# Patient Record
Sex: MALE | Race: WHITE
[De-identification: names, ages, dates, MRNs, and addresses within clinical notes are randomized per-mention and may not be internally consistent; named-entity substitution may affect disease eponyms.]

---

## 2019-12-10 ENCOUNTER — TRANSCRIBE ORDERS (OUTPATIENT)
Dept: OTHER | Age: 79
End: 2019-12-10

## 2019-12-10 DIAGNOSIS — N35.912 UNSPECIFIED BULBOUS URETHRAL STRICTURE, MALE: Primary | ICD-10-CM

## 2019-12-12 ENCOUNTER — PRE VISIT (OUTPATIENT)
Dept: UROLOGY | Facility: CLINIC | Age: 79
End: 2019-12-12

## 2019-12-12 DIAGNOSIS — Q64.32 CONGENITAL STRICTURE OF URETHRA: Primary | ICD-10-CM

## 2019-12-12 NOTE — TELEPHONE ENCOUNTER
MEDICAL RECORDS REQUEST   [facility]  Urology Clinic  [address] [place] [address]  PHONE: [phone]  Fax: [phone]        FUTURE VISIT INFORMATION                                                   Jayjay Mcgowan, : 1940 scheduled for future visit at [facility]    APPOINTMENT INFORMATION:    Date: 19 7AM    Provider:  Nithin Preez MD    Reason for Visit/Diagnosis: Eval for urethroplasty     REFERRAL INFORMATION:    Referring provider:  Amy De Jesus     Specialty: MD    [facility]     Clinic contact number:  [phone]    RECORDS REQUESTED FOR VISIT                                                     NOTES  STATUS/DETAILS   OFFICE NOTE from referring provider  yes   OFFICE NOTE from other specialist  no   DISCHARGE SUMMARY from hospital  no   DISCHARGE REPORT from the ER  no   OPERATIVE REPORT  no   MEDICATION LIST  no   LABS     URINALYSIS (UA) / PSA  yes     PRE-VISIT CHECKLIST      Record collection complete Yes- [facility] recs [facility] under Media tab    Appointment appropriately scheduled           (right time/right provider) Yes   MyChart activation If no, please explain: In process    Questionnaire complete If no, please explain: In process      Completed by: Yaneth Avila

## 2019-12-12 NOTE — TELEPHONE ENCOUNTER
Reason for visit: Urethral stricture consult     Relevant information: pt [facility], pt has a catheter    Records/imaging/labs/orders: orders placed for RUG/VCUG    Pt called: Yes    At Rooming: regular

## 2019-12-20 NOTE — PROGRESS NOTES
Urology Clinic    Nithin Perez MD  Date of Service: 2019     Name: Jayjay Mcgowan  MRN: [id number]  Age: 78 year old  : 1940  Referring provider: Amy De Jesus     Assessment and Plan:  A 78 year old-year-old gentleman with urethral stricture refractory to minimally invasive management.  He has been managed by prior urologist(s) and is referred to our center for advanced treatment options.    We removed the catheter today. He will follow up in 1 month with RUG VCUG. Based on x-rays, we may recommend SPT placement. We discussed urethroplasty as a possibility but may he may be too medically unfit. He may be a candidate for the drug coated balloon study based on his imaging next month.This is the treatment I would favor if the x-rays show him to be a candidate.    If he is not a candidate for Optilume study or urethroplasty then options would be intermittent office dilation, self dilation or SPT.   ---------------------------------------------------------------------------------------------------------------------    Chief Complaint:   Urethral stricture     HPI:   Mr. Mcgowan is a 78 year old-year-old man [facility]. Symptoms include slow stream and sense of incomplete emptying. He has had prior treatments for his urethral stricture. He does not have a history of self dilation. He currently performs self dilation Never. He does currently have an indwelling urethral catheter.    I don't have any records to review, but according to patients description it sounds like he has a history of prostate gold seed marker and [facility]. About 5 years ago he developed problems with MARIN/stricture. He has had 4-5 cystoscopies with dilation. Last cystoscopy was in 2019. Recently he went into retention and had a catheter placed about 15 days ago. He has occasional leakage, occasional hematuria, and has had one infection in  the past two years. The catheter is painful but draining well. He had one cystoscopy, clot evacuation, and fulguration greater than 3 years ago. Location of stricture is unclear.     Etiology: External beam radiation.     REVIEW OF QUESTIONNAIRES:  Questionnaires reviewed. See flowsheet for details.    He uses compression stockings for edema. He has COPD and can walk 1 flight of stairs before he is limited by shortness of breath. He is on warfarin for a history of DVT after travel. He denies constipation, diarrhea, or blood in the stool.     Review of Systems:   Pertinent items are noted in HPI or as below, remainder of complete ROS is negative.      Active Medications:     Current Outpatient Medications:      albuterol (PROAIR HFA, PROVENTIL HFA, VENTOLIN HFA) 108 (90 BASE) MCG/ACT inhaler, Inhale 2 puffs into the lungs every 6 hours, Disp: , Rfl:      budesonide-formoterol (SYMBICORT) 160-4.5 MCG/ACT inhaler, Inhale 2 puffs into the lungs 2 times daily, Disp: , Rfl:      fluticasone (FLOVENT DISKUS) 50 MCG/BLIST AEPB, Inhale 1 puff into the lungs every 12 hours, Disp: , Rfl:      hydrOXYzine (VISTARIL) 50 MG capsule, Take 50 mg by mouth 3 times daily as needed for itching, Disp: , Rfl:      PREDNISONE PO, Take by mouth as needed, Disp: , Rfl:      TAMSULOSIN HCL PO, Take 0.4 mg by mouth daily, Disp: , Rfl:      tiotropium (SPIRIVA) 18 MCG inhalation capsule, Inhale 18 mcg into the lungs daily, Disp: , Rfl:      WARFARIN SODIUM PO, Take 2 mg by mouth, Disp: , Rfl:      acetaminophen-codeine (TYLENOL #3) 300-30 MG per tablet, Take 1-2 tablets by mouth every 4 hours as needed for moderate pain, Disp: , Rfl:      CEPHALEXIN PO, Take 50 mg/kg/day by mouth, Disp: , Rfl:      ketoconazole (NIZORAL) 2 % shampoo, Apply topically daily as needed for itching or irritation, Disp: , Rfl:      MINOCYCLINE HCL PO, Take 100 mg by mouth, Disp: , Rfl:       Allergies:   Patient has no known allergies.      Past Medical  "History:  Squamous cell carcinoma  COPD   DVT after travel   No history of diabetes or MI.     Past Surgical History:  Skin lesion biopsy   MOHS procedure     Family History:   No past pertinent family history.     Social History:   Former smoker.      Physical Exam:   General:  Well-dressed, well-nourished man in no acute distress.  Vitals: BP (!) 143/72   Pulse 86   Ht 1.778 m (5' 10\")   Wt 111.6 kg (246 lb)   BMI 35.30 kg/m   Estimated body mass index is 35.3 kg/m  as calculated from the following:    Height as of this encounter: 1.778 m (5' 10\").    Weight as of this encounter: 111.6 kg (246 lb).  Eyes: anicteric, EOMI  Lymph: No cervical, supraclavicular or axillary lymphadenopathy  Lungs:  No respiratory distress.  Heart:  Regular rate and rhythm.     Abdomen:  moderately obese, soft, nontender, without masses.  There are not surgical scars.    :  Penis is not circumcised. Testes are 10 mL bilaterally without masses.  Rectal examination was not done. Catheter is in place in penis with some mild urethral meatus erosion. We reduced a paraphimosis and removed the urethral catheter.   Musculoskeletal:  Motor strength is excellent throughout.     Neurologic:  Grossly nonfocal.    Back: Flanks are nontender.    Scribe Disclosure:  I, Lucy Traylor, am serving as a scribe to document services personally performed by Nithin Perez MD at this visit, based upon the provider's statements to me. All documentation has been reviewed by the aforementioned provider prior to being entered into the official medical record.     Answers for HPI/ROS submitted by the patient on 12/23/2019   General Symptoms: Yes  Skin Symptoms: No  HENT Symptoms: No  EYE SYMPTOMS: No  HEART SYMPTOMS: No  LUNG SYMPTOMS: Yes  INTESTINAL SYMPTOMS: No  URINARY SYMPTOMS: Yes  REPRODUCTIVE SYMPTOMS: Yes  SKELETAL SYMPTOMS: No  BLOOD SYMPTOMS: Yes  NERVOUS SYSTEM SYMPTOMS: No  MENTAL HEALTH SYMPTOMS: Yes  Fever: No  Loss of appetite: " No  Weight loss: Yes  Weight gain: No  Fatigue: Yes  Night sweats: No  Chills: No  Increased stress: Yes  Excessive hunger: No  Excessive thirst: No  Feeling hot or cold when others believe the temperature is normal: No  Loss of height: No  Post-operative complications: No  Surgical site pain: Yes  Hallucinations: No  Change in or Loss of Energy: Yes  Hyperactivity: No  Confusion: No  Cough: No  Sputum or phlegm: No  Coughing up blood: No  Difficulty breating or shortness of breath: Yes  Snoring: No  Wheezing: Yes  Difficulty breathing on exertion: Yes  Nighttime Cough: No  Difficulty breathing when lying flat: No  Trouble holding urine or incontinence: Yes  Pain or burning: Yes  Trouble starting or stopping: Yes  Increased frequency of urination: No  Blood in urine: No  Decreased frequency of urination: Yes  Frequent nighttime urination: Yes  Flank pain: No  Difficulty emptying bladder: Yes  Anemia: No  Swollen glands: No  Easy bleeding or bruising: Yes  Edema or swelling: No  Scrotal pain or swelling: No  Erectile dysfunction: Yes  Penile discharge: No  Genital ulcers: No  Reduced libido: Yes  Nervous or Anxious: Yes  Depression: No  Trouble sleeping: No  Trouble thinking or concentrating: Yes  Mood changes: No  Panic attacks: No

## 2019-12-23 ENCOUNTER — OFFICE VISIT (OUTPATIENT)
Dept: UROLOGY | Facility: CLINIC | Age: 79
End: 2019-12-23
Payer: COMMERCIAL

## 2019-12-23 ENCOUNTER — PRE VISIT (OUTPATIENT)
Dept: UROLOGY | Facility: CLINIC | Age: 79
End: 2019-12-23

## 2019-12-23 VITALS
SYSTOLIC BLOOD PRESSURE: 143 MMHG | BODY MASS INDEX: 35.22 KG/M2 | HEIGHT: 70 IN | DIASTOLIC BLOOD PRESSURE: 72 MMHG | HEART RATE: 86 BPM | WEIGHT: 246 LBS

## 2019-12-23 DIAGNOSIS — C61 MALIGNANT NEOPLASM OF PROSTATE (H): Primary | ICD-10-CM

## 2019-12-23 DIAGNOSIS — N35.812 OTHER STRICTURE OF BULBOUS URETHRA IN MALE: ICD-10-CM

## 2019-12-23 ASSESSMENT — ENCOUNTER SYMPTOMS
CHILLS: 0
FLANK PAIN: 0
FATIGUE: 1
PANIC: 0
POSTURAL DYSPNEA: 0
POLYDIPSIA: 0
COUGH: 0
SNORES LOUDLY: 0
SHORTNESS OF BREATH: 1
DECREASED APPETITE: 0
FEVER: 0
DYSPNEA ON EXERTION: 1
SPUTUM PRODUCTION: 0
INCREASED ENERGY: 1
HALLUCINATIONS: 0
ALTERED TEMPERATURE REGULATION: 0
DYSURIA: 1
COUGH DISTURBING SLEEP: 0
HEMATURIA: 0
DECREASED CONCENTRATION: 1
BRUISES/BLEEDS EASILY: 1
NIGHT SWEATS: 0
WHEEZING: 1
DEPRESSION: 0
WEIGHT LOSS: 1
NERVOUS/ANXIOUS: 1
SWOLLEN GLANDS: 0
DIFFICULTY URINATING: 1
WEIGHT GAIN: 0
POLYPHAGIA: 0
HEMOPTYSIS: 0
INSOMNIA: 0

## 2019-12-23 ASSESSMENT — PAIN SCALES - GENERAL: PAINLEVEL: MILD PAIN (3)

## 2019-12-23 ASSESSMENT — MIFFLIN-ST. JEOR: SCORE: 1847.1

## 2019-12-23 NOTE — NURSING NOTE
16 Fr catheter removed without difficulty after removing 10 mL of fluid from the balloon. Pt tolerated the removal well. Pt instructed to have nothing put in the urethra for at least two weeks prior to imaging.

## 2019-12-23 NOTE — LETTER
2019       RE: Jayjay Mcgowan  [address] [address]     Dear Colleague,    Thank you for referring your patient, Jayjay Mcgowan, to the [facility] AND [facility] at [facility]. Please see a copy of my visit note below.    Urology Clinic    Nithin Perez MD  Date of Service: 2019     Name: Jayjay Mcgowan  MRN: [id number]  Age: 78 year old  : 1940  Referring provider: Amy De Jesus     Assessment and Plan:  A 78 year old-year-old gentleman with urethral stricture refractory to minimally invasive management.  He has been managed by prior urologist(s) and is referred to our center for advanced treatment options.    We removed the catheter today. He will follow up in 1 month with RUG VCUG. Based on x-rays, we may recommend SPT placement. We discussed urethroplasty as a possibility but may he may be too medically unfit. He may be a candidate for the drug coated balloon study based on his imaging next month.This is the treatment I would favor if the x-rays show him to be a candidate.    If he is not a candidate for Optilume study or urethroplasty then options would be intermittent office dilation, self dilation or SPT.   ---------------------------------------------------------------------------------------------------------------------    Chief Complaint:   Urethral stricture     HPI:   Mr. Mcgowan is a 78 year old-year-old man [facility]. Symptoms include slow stream and sense of incomplete emptying. He has had prior treatments for his urethral stricture. He does not have a history of self dilation. He currently performs self dilation Never. He does currently have an indwelling urethral catheter.    I don't have any records to review, but according to patients description it sounds like he has a history of prostate gold seed marker and [facility] beam  [facility]. About 5 years ago he developed problems with MARIN/stricture. He has had 4-5 cystoscopies with dilation. Last cystoscopy was in June 2019. Recently he went into retention and had a catheter placed about 15 days ago. He has occasional leakage, occasional hematuria, and has had one infection in the past two years. The catheter is painful but draining well. He had one cystoscopy, clot evacuation, and fulguration greater than 3 years ago. Location of stricture is unclear.     Etiology: External beam radiation.     REVIEW OF QUESTIONNAIRES:  Questionnaires reviewed. See flowsheet for details.    He uses compression stockings for edema. He has COPD and can walk 1 flight of stairs before he is limited by shortness of breath. He is on warfarin for a history of DVT after travel. He denies constipation, diarrhea, or blood in the stool.     Review of Systems:   Pertinent items are noted in HPI or as below, remainder of complete ROS is negative.      Active Medications:     Current Outpatient Medications:      albuterol (PROAIR HFA, PROVENTIL HFA, VENTOLIN HFA) 108 (90 BASE) MCG/ACT inhaler, Inhale 2 puffs into the lungs every 6 hours, Disp: , Rfl:      budesonide-formoterol (SYMBICORT) 160-4.5 MCG/ACT inhaler, Inhale 2 puffs into the lungs 2 times daily, Disp: , Rfl:      fluticasone (FLOVENT DISKUS) 50 MCG/BLIST AEPB, Inhale 1 puff into the lungs every 12 hours, Disp: , Rfl:      hydrOXYzine (VISTARIL) 50 MG capsule, Take 50 mg by mouth 3 times daily as needed for itching, Disp: , Rfl:      PREDNISONE PO, Take by mouth as needed, Disp: , Rfl:      TAMSULOSIN HCL PO, Take 0.4 mg by mouth daily, Disp: , Rfl:      tiotropium (SPIRIVA) 18 MCG inhalation capsule, Inhale 18 mcg into the lungs daily, Disp: , Rfl:      WARFARIN SODIUM PO, Take 2 mg by mouth, Disp: , Rfl:      acetaminophen-codeine (TYLENOL #3) 300-30 MG per tablet, Take 1-2 tablets by mouth every 4 hours as needed for moderate pain,  "Disp: , Rfl:      CEPHALEXIN PO, Take 50 mg/kg/day by mouth, Disp: , Rfl:      ketoconazole (NIZORAL) 2 % shampoo, Apply topically daily as needed for itching or irritation, Disp: , Rfl:      MINOCYCLINE HCL PO, Take 100 mg by mouth, Disp: , Rfl:       Allergies:   Patient has no known allergies.      Past Medical History:  Squamous cell carcinoma  COPD   DVT after travel   No history of diabetes or MI.     Past Surgical History:  Skin lesion biopsy   MOHS procedure     Family History:   No past pertinent family history.     Social History:   Former smoker.      Physical Exam:   General:  Well-dressed, well-nourished man in no acute distress.  Vitals: BP (!) 143/72   Pulse 86   Ht 1.778 m (5' 10\")   Wt 111.6 kg (246 lb)   BMI 35.30 kg/m    Estimated body mass index is 35.3 kg/m  as calculated from the following:    Height as of this encounter: 1.778 m (5' 10\").    Weight as of this encounter: 111.6 kg (246 lb).  Eyes: anicteric, EOMI  Lymph: No cervical, supraclavicular or axillary lymphadenopathy  Lungs:  No respiratory distress.  Heart:  Regular rate and rhythm.     Abdomen:  moderately obese, soft, nontender, without masses.  There are not surgical scars.    :  Penis is not circumcised. Testes are 10 mL bilaterally without masses.  Rectal examination was not done. Catheter is in place in penis with some mild urethral meatus erosion. We reduced a paraphimosis and removed the urethral catheter.   Musculoskeletal:  Motor strength is excellent throughout.     Neurologic:  Grossly nonfocal.    Back: Flanks are nontender.    Scribe Disclosure:  Lucy IGLESIAS, am serving as a scribe to document services personally performed by Nithin Perez MD at this visit, based upon the provider's statements to me. All documentation has been reviewed by the aforementioned provider prior to being entered into the official medical record.     Answers for HPI/ROS submitted by the patient on 12/23/2019   General " Symptoms: Yes  Skin Symptoms: No  HENT Symptoms: No  EYE SYMPTOMS: No  HEART SYMPTOMS: No  LUNG SYMPTOMS: Yes  INTESTINAL SYMPTOMS: No  URINARY SYMPTOMS: Yes  REPRODUCTIVE SYMPTOMS: Yes  SKELETAL SYMPTOMS: No  BLOOD SYMPTOMS: Yes  NERVOUS SYSTEM SYMPTOMS: No  MENTAL HEALTH SYMPTOMS: Yes  Fever: No  Loss of appetite: No  Weight loss: Yes  Weight gain: No  Fatigue: Yes  Night sweats: No  Chills: No  Increased stress: Yes  Excessive hunger: No  Excessive thirst: No  Feeling hot or cold when others believe the temperature is normal: No  Loss of height: No  Post-operative complications: No  Surgical site pain: Yes  Hallucinations: No  Change in or Loss of Energy: Yes  Hyperactivity: No  Confusion: No  Cough: No  Sputum or phlegm: No  Coughing up blood: No  Difficulty breating or shortness of breath: Yes  Snoring: No  Wheezing: Yes  Difficulty breathing on exertion: Yes  Nighttime Cough: No  Difficulty breathing when lying flat: No  Trouble holding urine or incontinence: Yes  Pain or burning: Yes  Trouble starting or stopping: Yes  Increased frequency of urination: No  Blood in urine: No  Decreased frequency of urination: Yes  Frequent nighttime urination: Yes  Flank pain: No  Difficulty emptying bladder: Yes  Anemia: No  Swollen glands: No  Easy bleeding or bruising: Yes  Edema or swelling: No  Scrotal pain or swelling: No  Erectile dysfunction: Yes  Penile discharge: No  Genital ulcers: No  Reduced libido: Yes  Nervous or Anxious: Yes  Depression: No  Trouble sleeping: No  Trouble thinking or concentrating: Yes  Mood changes: No  Panic attacks: No    Nithin Perez MD

## 2019-12-23 NOTE — PATIENT INSTRUCTIONS
Return in one month with a RUG/VCUG.    Do not have anything inserted into your penis for at least two weeks prior to imaging.

## 2019-12-23 NOTE — NURSING NOTE
"Chief Complaint   Patient presents with     Consult     Urethral stricture consult - pt has a catheter       Blood pressure (!) 143/72, pulse 86, height 1.778 m (5' 10\"), weight 111.6 kg (246 lb). Body mass index is 35.3 kg/m .    There is no problem list on file for this patient.      No Known Allergies    Current Outpatient Medications   Medication Sig Dispense Refill     albuterol (PROAIR HFA, PROVENTIL HFA, VENTOLIN HFA) 108 (90 BASE) MCG/ACT inhaler Inhale 2 puffs into the lungs every 6 hours       budesonide-formoterol (SYMBICORT) 160-4.5 MCG/ACT inhaler Inhale 2 puffs into the lungs 2 times daily       fluticasone (FLOVENT DISKUS) 50 MCG/BLIST AEPB Inhale 1 puff into the lungs every 12 hours       hydrOXYzine (VISTARIL) 50 MG capsule Take 50 mg by mouth 3 times daily as needed for itching       PREDNISONE PO Take by mouth as needed       TAMSULOSIN HCL PO Take 0.4 mg by mouth daily       tiotropium (SPIRIVA) 18 MCG inhalation capsule Inhale 18 mcg into the lungs daily       WARFARIN SODIUM PO Take 2 mg by mouth       acetaminophen-codeine (TYLENOL #3) 300-30 MG per tablet Take 1-2 tablets by mouth every 4 hours as needed for moderate pain       CEPHALEXIN PO Take 50 mg/kg/day by mouth       ketoconazole (NIZORAL) 2 % shampoo Apply topically daily as needed for itching or irritation       MINOCYCLINE HCL PO Take 100 mg by mouth         Social History     Tobacco Use     Smoking status: Former Smoker     Smokeless tobacco: Never Used   Substance Use Topics     Alcohol use: Not on file     Drug use: Not on file       Dianne Harman CMA, BOOKER  12/23/2019  7:05 AM     "

## 2020-01-10 ENCOUNTER — PRE VISIT (OUTPATIENT)
Dept: UROLOGY | Facility: CLINIC | Age: 80
End: 2020-01-10

## 2020-01-11 NOTE — TELEPHONE ENCOUNTER
Reason for visit: Urethral stricture consult          Relevant information: [facility], pt has a catheter     Records/imaging/labs/orders: all appointments scheduled      Pt called: Yes     At Rooming: flow/pvr

## 2020-01-13 NOTE — PROGRESS NOTES
Urology Clinic    Nithin Perez MD  Date of Service: 2020     Name: Jayjay Mcgowan  MRN: [id number]  Age: 79 year old  : 1940  Referring provider: Amy De Jesus     Assessment:  Jayjay Mcgowan  is a 79 year old male with a history of prostate cancer s/p prostate gold prostate marker and external beam radiation in  and recurrent urethral stricture s/p 4-[facility]. Last month he went into urinary retention and had a Antoine placed. Catheter was removed on 2019 and he underwent RUG/VCUG today. We discussed his results which did not show any evidence of urethral stricture, though, it has only been a month since his catheter was removed. 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 We will plan on having him return for a cystoscopy to evaluate. If there is no evidence of stricture at that time, he agrees there would be no need for surgery.     Plan:  -Cystoscopy. If patient calls to report impending urinary retention then we should get him in the office within a couple of days to cysto/dilate.   ---------------------------------------------------------------------------------------------------------------------    Chief Complaint:   Chief Complaint   Patient presents with     RECHECK     urethral stricture consult       HPI:   Jayjay Mcgowan  is a 79 year old male who has a history of urethral stricture last seen with me on 2019. His symptoms include slow stream and incomplete sense of emptying. He has had prior treatments for his urethral stricture. He currently performs self dilation. He does currently have an indwelling urethral catheter.     There are no prior records to review, but according to his description it sounds like he has a history of prostate gold seed marker and [facility]. About 5 years ago he developed problems with MARIN/stricture and since has had 4-5 cystoscopies with  dilation (most completed in office). His last cystoscopy was in June 2019. Recently he went into retention and had a catheter placed in the beginning of December 2019. When I saw him on 12/23/2019, he did report occasional leakage,  Hematuria and one infection in the past 2 years. At that time, he also reported his catheter was painful but draining well. At our last visit, he had his catheter removed and he presents today for RUG/VCUG.     Today, he reports doing well without leakage, urinary retention, urgency or hematuria. However, he does continues to urinate every 1-1.5 hours during the day and 1-3 times per night.     Standardized Questionnaire:  [facility] uro prostate review 1/20/2020   AUA - Total Score 17   Sexual Health History for Men (SARTHAK) 21       Review of Systems:   Pertinent items are noted in HPI or as below, remainder of complete ROS is negative.      Active Medications:      acetaminophen-codeine (TYLENOL #3) 300-30 MG per tablet, Take 1-2 tablets by mouth every 4 hours as needed for moderate pain, Disp: , Rfl:      albuterol (PROAIR HFA, PROVENTIL HFA, VENTOLIN HFA) 108 (90 BASE) MCG/ACT inhaler, Inhale 2 puffs into the lungs every 6 hours, Disp: , Rfl:      budesonide-formoterol (SYMBICORT) 160-4.5 MCG/ACT inhaler, Inhale 2 puffs into the lungs 2 times daily, Disp: , Rfl:      CEPHALEXIN PO, Take 50 mg/kg/day by mouth, Disp: , Rfl:      fluticasone (FLOVENT DISKUS) 50 MCG/BLIST AEPB, Inhale 1 puff into the lungs every 12 hours, Disp: , Rfl:      hydrOXYzine (VISTARIL) 50 MG capsule, Take 50 mg by mouth 3 times daily as needed for itching, Disp: , Rfl:      ketoconazole (NIZORAL) 2 % shampoo, Apply topically daily as needed for itching or irritation, Disp: , Rfl:      MINOCYCLINE HCL PO, Take 100 mg by mouth, Disp: , Rfl:      PREDNISONE PO, Take by mouth as needed, Disp: , Rfl:      TAMSULOSIN HCL PO, Take 0.4 mg by mouth daily, Disp: , Rfl:      tiotropium (SPIRIVA) 18 MCG inhalation capsule, Inhale  "18 mcg into the lungs daily, Disp: , Rfl:      WARFARIN SODIUM PO, Take 2 mg by mouth, Disp: , Rfl:   No current facility-administered medications for this visit.       Allergies:   Patient has no known allergies.      Past Medical History:  Squamous cell carcinoma      Past Surgical History:  MOHS    Family History:   No pertinent family history       Social History:   The patient was accompanied to the appointment by: wife   Smoking Status: former  Smokeless Tobacco: never    Alcohol Use: no      Physical Exam:   General:  Well-dressed, well-nourished man in no acute distress.  Vitals: BP (!) 152/94   Pulse 73   Ht 1.778 m (5' 10\")   Wt 111.1 kg (245 lb)   BMI 35.15 kg/m   Estimated body mass index is 35.15 kg/m  as calculated from the following:    Height as of this encounter: 1.778 m (5' 10\").    Weight as of this encounter: 111.1 kg (245 lb).  Eyes: anicteric, EOMI    Imaging:   I have personally reviewed the results of the below imaging studies. The results were discussed with the patient.   No evidence of urethral stricture. I can see a gold seed near apex of prostate.       Scribe Disclosure:  I, Juli Mckeon, am serving as a scribe to document services personally performed by Nithin Perez MD at this visit, based upon the provider's statements to me. All documentation has been reviewed by the aforementioned provider prior to being entered into the official medical record.           "

## 2020-01-20 ENCOUNTER — ANCILLARY PROCEDURE (OUTPATIENT)
Dept: GENERAL RADIOLOGY | Facility: CLINIC | Age: 80
End: 2020-01-20
Attending: UROLOGY
Payer: COMMERCIAL

## 2020-01-20 ENCOUNTER — OFFICE VISIT (OUTPATIENT)
Dept: UROLOGY | Facility: CLINIC | Age: 80
End: 2020-01-20
Payer: COMMERCIAL

## 2020-01-20 VITALS
HEIGHT: 70 IN | HEART RATE: 73 BPM | SYSTOLIC BLOOD PRESSURE: 152 MMHG | BODY MASS INDEX: 35.07 KG/M2 | WEIGHT: 245 LBS | DIASTOLIC BLOOD PRESSURE: 94 MMHG

## 2020-01-20 DIAGNOSIS — C61 MALIGNANT NEOPLASM OF PROSTATE (H): ICD-10-CM

## 2020-01-20 DIAGNOSIS — N35.812 OTHER STRICTURE OF BULBOUS URETHRA IN MALE: Primary | ICD-10-CM

## 2020-01-20 DIAGNOSIS — Q64.32 CONGENITAL STRICTURE OF URETHRA: ICD-10-CM

## 2020-01-20 RX ORDER — LIDOCAINE HYDROCHLORIDE 20 MG/ML
10 JELLY TOPICAL ONCE
Status: COMPLETED | OUTPATIENT
Start: 2020-01-20 | End: 2020-01-20

## 2020-01-20 RX ADMIN — LIDOCAINE HYDROCHLORIDE 10 ML: 20 JELLY TOPICAL at 09:26

## 2020-01-20 ASSESSMENT — MIFFLIN-ST. JEOR: SCORE: 1837.56

## 2020-01-20 ASSESSMENT — PAIN SCALES - GENERAL: PAINLEVEL: NO PAIN (0)

## 2020-01-20 NOTE — NURSING NOTE
"Chief Complaint   Patient presents with     RECHECK       Blood pressure (!) 152/94, pulse 73, height 1.778 m (5' 10\"), weight 111.1 kg (245 lb). Body mass index is 35.15 kg/m .    There is no problem list on file for this patient.      No Known Allergies    Current Outpatient Medications   Medication Sig Dispense Refill     acetaminophen-codeine (TYLENOL #3) 300-30 MG per tablet Take 1-2 tablets by mouth every 4 hours as needed for moderate pain       albuterol (PROAIR HFA, PROVENTIL HFA, VENTOLIN HFA) 108 (90 BASE) MCG/ACT inhaler Inhale 2 puffs into the lungs every 6 hours       budesonide-formoterol (SYMBICORT) 160-4.5 MCG/ACT inhaler Inhale 2 puffs into the lungs 2 times daily       CEPHALEXIN PO Take 50 mg/kg/day by mouth       fluticasone (FLOVENT DISKUS) 50 MCG/BLIST AEPB Inhale 1 puff into the lungs every 12 hours       hydrOXYzine (VISTARIL) 50 MG capsule Take 50 mg by mouth 3 times daily as needed for itching       ketoconazole (NIZORAL) 2 % shampoo Apply topically daily as needed for itching or irritation       MINOCYCLINE HCL PO Take 100 mg by mouth       PREDNISONE PO Take by mouth as needed       TAMSULOSIN HCL PO Take 0.4 mg by mouth daily       tiotropium (SPIRIVA) 18 MCG inhalation capsule Inhale 18 mcg into the lungs daily       WARFARIN SODIUM PO Take 2 mg by mouth         Social History     Tobacco Use     Smoking status: Former Smoker     Smokeless tobacco: Never Used   Substance Use Topics     Alcohol use: None     Drug use: None       Dianne Harman CMA, RMA  1/20/2020  9:43 AM     "

## 2020-01-20 NOTE — LETTER
2020       RE: Jayjay Mcgowan  [address] [id number]     Dear Colleague,    Thank you for referring your patient, Jayjay Mcgowan, to the [facility] UROLOGY AND [facility] FOR PROSTATE AND UROLOGIC CANCERS at [facility]. Please see a copy of my visit note below.      Urology Clinic    Nithin Perez MD  Date of Service: 2020     Name: Jayjay Mcgowan  MRN: [id number]  Age: 79 year old  : 1940  Referring provider: Amy De Jesus     Assessment:  Jayjay Mcgowan  is a 79 year old male with a history of prostate cancer s/p prostate gold prostate marker and external beam radiation in  and recurrent urethral stricture s/[facility]. Last month he went into urinary retention and had a Antoine placed. Catheter was removed on 2019 and he underwent RUG/VCUG today. We discussed his results which did not show any evidence of urethral stricture, though, it has only been a month since his catheter was removed. 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 We will plan on having him return for a cystoscopy to evaluate. If there is no evidence of stricture at that time, he agrees there would be no need for surgery.     Plan:  -Cystoscopy. If patient calls to report impending urinary retention then we should get him in the office within a couple of days to cysto/dilate.   ---------------------------------------------------------------------------------------------------------------------    Chief Complaint:   Chief Complaint   Patient presents with     RECHECK     urethral stricture consult       HPI:   Jayjay Mcgowan  is a 79 year old male who has a history of urethral stricture last seen with me on 2019. His symptoms include slow stream and incomplete sense of emptying. He has had prior treatments for his urethral stricture. He currently performs self dilation. He does currently  have an indwelling urethral catheter.     There are no prior records to review, but according to his description it sounds like he has a history of prostate gold seed marker and [facility]. About 5 years ago he developed problems with MARIN/stricture and since has had 4-5 cystoscopies with dilation (most completed in office). His last cystoscopy was in June 2019. Recently he went into retention and had a catheter placed in the beginning of December 2019. When I saw him on 12/23/2019, he did report occasional leakage,  Hematuria and one infection in the past 2 years. At that time, he also reported his catheter was painful but draining well. At our last visit, he had his catheter removed and he presents today for RUG/VCUG.     Today, he reports doing well without leakage, urinary retention, urgency or hematuria. However, he does continues to urinate every 1-1.5 hours during the day and 1-3 times per night.     Standardized Questionnaire:  [facility] uro prostate review 1/20/2020   AUA - Total Score 17   Sexual Health History for Men (SARTHAK) 21       Review of Systems:   Pertinent items are noted in HPI or as below, remainder of complete ROS is negative.      Active Medications:      acetaminophen-codeine (TYLENOL #3) 300-30 MG per tablet, Take 1-2 tablets by mouth every 4 hours as needed for moderate pain, Disp: , Rfl:      albuterol (PROAIR HFA, PROVENTIL HFA, VENTOLIN HFA) 108 (90 BASE) MCG/ACT inhaler, Inhale 2 puffs into the lungs every 6 hours, Disp: , Rfl:      budesonide-formoterol (SYMBICORT) 160-4.5 MCG/ACT inhaler, Inhale 2 puffs into the lungs 2 times daily, Disp: , Rfl:      CEPHALEXIN PO, Take 50 mg/kg/day by mouth, Disp: , Rfl:      fluticasone (FLOVENT DISKUS) 50 MCG/BLIST AEPB, Inhale 1 puff into the lungs every 12 hours, Disp: , Rfl:      hydrOXYzine (VISTARIL) 50 MG capsule, Take 50 mg by mouth 3 times daily as needed for itching, Disp: , Rfl:      ketoconazole (NIZORAL) 2 %  "shampoo, Apply topically daily as needed for itching or irritation, Disp: , Rfl:      MINOCYCLINE HCL PO, Take 100 mg by mouth, Disp: , Rfl:      PREDNISONE PO, Take by mouth as needed, Disp: , Rfl:      TAMSULOSIN HCL PO, Take 0.4 mg by mouth daily, Disp: , Rfl:      tiotropium (SPIRIVA) 18 MCG inhalation capsule, Inhale 18 mcg into the lungs daily, Disp: , Rfl:      WARFARIN SODIUM PO, Take 2 mg by mouth, Disp: , Rfl:   No current facility-administered medications for this visit.       Allergies:   Patient has no known allergies.      Past Medical History:  Squamous cell carcinoma      Past Surgical History:  MOHS    Family History:   No pertinent family history       Social History:   The patient was accompanied to the appointment by: wife   Smoking Status: former  Smokeless Tobacco: never    Alcohol Use: no      Physical Exam:   General:  Well-dressed, well-nourished man in no acute distress.  Vitals: BP (!) 152/94   Pulse 73   Ht 1.778 m (5' 10\")   Wt 111.1 kg (245 lb)   BMI 35.15 kg/m    Estimated body mass index is 35.15 kg/m  as calculated from the following:    Height as of this encounter: 1.778 m (5' 10\").    Weight as of this encounter: 111.1 kg (245 lb).  Eyes: anicteric, EOMI    Imaging:   I have personally reviewed the results of the below imaging studies. The results were discussed with the patient.   No evidence of urethral stricture. I can see a gold seed near apex of prostate.       Scribe Disclosure:  I, Juli Mckeon, am serving as a scribe to document services personally performed by Nithin Perez MD at this visit, based upon the provider's statements to me. All documentation has been reviewed by the aforementioned provider prior to being entered into the official medical record.       Sincerely,    Nithin Perez MD      "

## 2020-01-20 NOTE — PATIENT INSTRUCTIONS
Return in three months for a cystoscopy. Please come with a full bladder for a flow/pvr.    It was a pleasure meeting with you today.  Thank you for allowing me and my team the privilege of caring for you today.  YOU are the reason we are here, and I truly hope we provided you with the excellent service you deserve.  Please let us know if there is anything else we can do for you so that we can be sure you are leaving completely satisfied with your care experience.

## 2020-04-13 ENCOUNTER — TELEPHONE (OUTPATIENT)
Dept: UROLOGY | Facility: CLINIC | Age: 80
End: 2020-04-13

## 2020-04-13 ENCOUNTER — PRE VISIT (OUTPATIENT)
Dept: UROLOGY | Facility: CLINIC | Age: 80
End: 2020-04-13

## 2020-04-13 NOTE — TELEPHONE ENCOUNTER
Spoke with patient to convert their upcoming appointment with Dr. Perez to a Telephone Visit. Patient declined Video Visit option.      Malou Elliott EMT

## 2020-04-13 NOTE — TELEPHONE ENCOUNTER
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

## 2020-04-20 ENCOUNTER — VIRTUAL VISIT (OUTPATIENT)
Dept: UROLOGY | Facility: CLINIC | Age: 80
End: 2020-04-20
Payer: COMMERCIAL

## 2020-04-20 DIAGNOSIS — T66.XXXS RADIATION ADVERSE EFFECT, SEQUELA: ICD-10-CM

## 2020-04-20 DIAGNOSIS — Z87.448 HISTORY OF URETHRAL STRICTURE: Primary | ICD-10-CM

## 2020-04-20 NOTE — NURSING NOTE
Called the pt 04/20/20, and 7:52 AM and reviewed their medications, history, and allergies. I then asked that they be in a quiet location with limed distractions so they can hear the provider well.    Chief Complaint   Patient presents with     Telemedicine consult     Stricture       There were no vitals taken for this visit. There is no height or weight on file to calculate BMI.    There is no problem list on file for this patient.      No Known Allergies    Current Outpatient Medications   Medication Sig Dispense Refill     acetaminophen-codeine (TYLENOL #3) 300-30 MG per tablet Take 1-2 tablets by mouth every 4 hours as needed for moderate pain       albuterol (PROAIR HFA, PROVENTIL HFA, VENTOLIN HFA) 108 (90 BASE) MCG/ACT inhaler Inhale 2 puffs into the lungs every 6 hours       budesonide-formoterol (SYMBICORT) 160-4.5 MCG/ACT inhaler Inhale 2 puffs into the lungs 2 times daily       fluticasone (FLOVENT DISKUS) 50 MCG/BLIST AEPB Inhale 1 puff into the lungs every 12 hours       hydrOXYzine (VISTARIL) 50 MG capsule Take 50 mg by mouth 3 times daily as needed for itching       ketoconazole (NIZORAL) 2 % shampoo Apply topically daily as needed for itching or irritation       MINOCYCLINE HCL PO Take 100 mg by mouth       PREDNISONE PO Take by mouth as needed       TAMSULOSIN HCL PO Take 0.4 mg by mouth daily       tiotropium (SPIRIVA) 18 MCG inhalation capsule Inhale 18 mcg into the lungs daily       WARFARIN SODIUM PO Take 2 mg by mouth       CEPHALEXIN PO Take 50 mg/kg/day by mouth         Social History     Tobacco Use     Smoking status: Former Smoker     Smokeless tobacco: Never Used   Substance Use Topics     Alcohol use: None     Drug use: None       Hermes Rosas, EMT,  4/20/2020  7:52 AM

## 2020-04-20 NOTE — PROGRESS NOTES
"Jayjay Mcgowan is a 79 year old male who is being evaluated via a billable telephone visit.      The patient has been notified of following:     \"This telephone visit will be conducted via a call between you and your physician/provider. We have found that certain health care needs can be provided without the need for a physical exam.  This service lets us provide the care you need with a short phone conversation.  If a prescription is necessary we can send it directly to your pharmacy.  If lab work is needed we can place an order for that and you can then stop by our lab to have the test done at a later time.    Telephone visits are billed at different rates depending on your insurance coverage. During this emergency period, for some insurers they may be billed the same as an in-person visit.  Please reach out to your insurance provider with any questions.    If during the course of the call the physician/provider feels a telephone visit is not appropriate, you will not be charged for this service.\"    Patient has given verbal consent for Telephone visit?  Yes    How would you like to obtain your AVS? Mail a copy    Additional provider notes: 80 y/o man with a history of external beam radiation of the prostate in 2008 who developed a urethral stricture [facility]  In December we removed his catheter and then in January we performed a rug VCUG which did not demonstrate any stricture at the time.  Since then, he reports he has been urinating very well.  He has no incontinence.  He has a strong flow.  He denies hematuria, urgency or frequency.  We had planned to follow-up with a cystoscopy but that is been delayed due to the coronavirus pandemic.  At this point, his symptoms are so good that I do not know that a cystoscopy is really warranted.  If we saw stricture I would be hesitant to intervene given how happy he has with his flow at this point.      So, he can follow-up as needed.  " However, if he should call in the future with reduced stream and desires a repeat visit then we should arrange a cystoscopy for that visit.    Phone call duration: 7 minutes    Nithin Perez MD

## 2025-08-14 ENCOUNTER — TRANSCRIBE ORDERS (OUTPATIENT)
Dept: OTHER | Age: 85
End: 2025-08-14

## 2025-08-14 ENCOUNTER — TRANSFERRED RECORDS (OUTPATIENT)
Dept: HEALTH INFORMATION MANAGEMENT | Facility: CLINIC | Age: 85
End: 2025-08-14

## 2025-08-14 DIAGNOSIS — N35.812 OTHER BULBOUS URETHRAL STRICTURE, MALE: Primary | ICD-10-CM

## 2025-08-21 ENCOUNTER — TELEPHONE (OUTPATIENT)
Dept: UROLOGY | Facility: CLINIC | Age: 85
End: 2025-08-21

## 2025-08-25 VITALS
SYSTOLIC BLOOD PRESSURE: 199 MMHG | OXYGEN SATURATION: 96 % | TEMPERATURE: 98 F | DIASTOLIC BLOOD PRESSURE: 99 MMHG | HEIGHT: 70 IN | BODY MASS INDEX: 32.64 KG/M2 | HEART RATE: 61 BPM | RESPIRATION RATE: 22 BRPM | WEIGHT: 228 LBS

## 2025-08-25 LAB
ALBUMIN UR-MCNC: NEGATIVE MG/DL
APPEARANCE UR: CLEAR
BACTERIA #/AREA URNS HPF: ABNORMAL /HPF
BILIRUB UR QL STRIP: NEGATIVE
COLOR UR AUTO: COLORLESS
GLUCOSE UR STRIP-MCNC: NEGATIVE MG/DL
HGB UR QL STRIP: ABNORMAL
KETONES UR STRIP-MCNC: NEGATIVE MG/DL
LEUKOCYTE ESTERASE UR QL STRIP: NEGATIVE
NITRATE UR QL: NEGATIVE
PH UR STRIP: 6 [PH] (ref 5–7)
RBC URINE: 1 /HPF
SP GR UR STRIP: 1 (ref 1–1.03)
UROBILINOGEN UR STRIP-MCNC: NORMAL MG/DL
WBC URINE: 1 /HPF

## 2025-08-25 PROCEDURE — 51798 US URINE CAPACITY MEASURE: CPT

## 2025-08-25 PROCEDURE — 99284 EMERGENCY DEPT VISIT MOD MDM: CPT | Performed by: EMERGENCY MEDICINE

## 2025-08-25 PROCEDURE — 81001 URINALYSIS AUTO W/SCOPE: CPT | Performed by: EMERGENCY MEDICINE

## 2025-08-25 PROCEDURE — 51702 INSERT TEMP BLADDER CATH: CPT

## 2025-08-25 ASSESSMENT — ENCOUNTER SYMPTOMS
DIFFICULTY URINATING: 1
ABDOMINAL PAIN: 1
SHORTNESS OF BREATH: 0
FEVER: 0
DIARRHEA: 0
COUGH: 0
VOMITING: 0
NAUSEA: 0

## 2025-08-26 ENCOUNTER — HOSPITAL ENCOUNTER (EMERGENCY)
Facility: HOSPITAL | Age: 85
Discharge: HOME | End: 2025-08-26
Attending: EMERGENCY MEDICINE | Admitting: EMERGENCY MEDICINE
Payer: COMMERCIAL

## 2025-08-26 DIAGNOSIS — R33.8 ACUTE URINARY RETENTION: Primary | ICD-10-CM

## 2025-08-27 ENCOUNTER — TELEPHONE (OUTPATIENT)
Dept: UROLOGY | Facility: CLINIC | Age: 85
End: 2025-08-27
Payer: COMMERCIAL

## 2025-10-31 ENCOUNTER — PRE VISIT (OUTPATIENT)
Dept: UROLOGY | Facility: CLINIC | Age: 85
End: 2025-10-31
Payer: COMMERCIAL

## (undated) RX ORDER — LIDOCAINE HYDROCHLORIDE 20 MG/ML
JELLY TOPICAL
Status: DISPENSED
Start: 2020-01-20